# Patient Record
Sex: FEMALE | Race: WHITE | Employment: OTHER | URBAN - METROPOLITAN AREA
[De-identification: names, ages, dates, MRNs, and addresses within clinical notes are randomized per-mention and may not be internally consistent; named-entity substitution may affect disease eponyms.]

---

## 2019-03-08 ENCOUNTER — HOSPITAL ENCOUNTER (EMERGENCY)
Age: 84
Discharge: HOME | DRG: 603 | End: 2019-03-08
Attending: EMERGENCY MEDICINE | Admitting: EMERGENCY MEDICINE
Payer: MEDICARE

## 2019-03-08 VITALS
HEART RATE: 109 BPM | TEMPERATURE: 98.4 F | OXYGEN SATURATION: 98 % | BODY MASS INDEX: 26.98 KG/M2 | HEIGHT: 64 IN | SYSTOLIC BLOOD PRESSURE: 128 MMHG | DIASTOLIC BLOOD PRESSURE: 62 MMHG | WEIGHT: 158 LBS | RESPIRATION RATE: 23 BRPM

## 2019-03-08 DIAGNOSIS — L03.116 CELLULITIS OF LEFT ANKLE: Primary | ICD-10-CM

## 2019-03-08 DIAGNOSIS — S91.012A LACERATION OF LEFT ANKLE, INITIAL ENCOUNTER: ICD-10-CM

## 2019-03-08 LAB
ANION GAP SERPL CALC-SCNC: 7 MMOL/L (ref 3–18)
BASOPHILS # BLD: 0 K/UL (ref 0–0.1)
BASOPHILS NFR BLD: 0 % (ref 0–2)
BUN SERPL-MCNC: 26 MG/DL (ref 7–18)
BUN/CREAT SERPL: 31 (ref 12–20)
CALCIUM SERPL-MCNC: 9.8 MG/DL (ref 8.5–10.1)
CHLORIDE SERPL-SCNC: 106 MMOL/L (ref 100–108)
CO2 SERPL-SCNC: 27 MMOL/L (ref 21–32)
CREAT SERPL-MCNC: 0.85 MG/DL (ref 0.6–1.3)
DIFFERENTIAL METHOD BLD: ABNORMAL
EOSINOPHIL # BLD: 0.1 K/UL (ref 0–0.4)
EOSINOPHIL NFR BLD: 1 % (ref 0–5)
ERYTHROCYTE [DISTWIDTH] IN BLOOD BY AUTOMATED COUNT: 14 % (ref 11.6–14.5)
GLUCOSE SERPL-MCNC: 203 MG/DL (ref 74–99)
HCT VFR BLD AUTO: 43.1 % (ref 35–45)
HGB BLD-MCNC: 13.8 G/DL (ref 12–16)
LACTATE BLD-SCNC: 1.42 MMOL/L (ref 0.4–2)
LYMPHOCYTES # BLD: 0.6 K/UL (ref 0.9–3.6)
LYMPHOCYTES NFR BLD: 5 % (ref 21–52)
MCH RBC QN AUTO: 28.2 PG (ref 24–34)
MCHC RBC AUTO-ENTMCNC: 32 G/DL (ref 31–37)
MCV RBC AUTO: 88.1 FL (ref 74–97)
MONOCYTES # BLD: 0.8 K/UL (ref 0.05–1.2)
MONOCYTES NFR BLD: 7 % (ref 3–10)
NEUTS SEG # BLD: 9.9 K/UL (ref 1.8–8)
NEUTS SEG NFR BLD: 87 % (ref 40–73)
PLATELET # BLD AUTO: 178 K/UL (ref 135–420)
PMV BLD AUTO: 9.8 FL (ref 9.2–11.8)
POTASSIUM SERPL-SCNC: 3.8 MMOL/L (ref 3.5–5.5)
RBC # BLD AUTO: 4.89 M/UL (ref 4.2–5.3)
SODIUM SERPL-SCNC: 140 MMOL/L (ref 136–145)
WBC # BLD AUTO: 11.4 K/UL (ref 4.6–13.2)

## 2019-03-08 PROCEDURE — 74011250636 HC RX REV CODE- 250/636: Performed by: EMERGENCY MEDICINE

## 2019-03-08 PROCEDURE — 99284 EMERGENCY DEPT VISIT MOD MDM: CPT

## 2019-03-08 PROCEDURE — 74011000250 HC RX REV CODE- 250: Performed by: EMERGENCY MEDICINE

## 2019-03-08 PROCEDURE — 96375 TX/PRO/DX INJ NEW DRUG ADDON: CPT

## 2019-03-08 PROCEDURE — 96365 THER/PROPH/DIAG IV INF INIT: CPT

## 2019-03-08 PROCEDURE — 96366 THER/PROPH/DIAG IV INF ADDON: CPT

## 2019-03-08 PROCEDURE — 83605 ASSAY OF LACTIC ACID: CPT

## 2019-03-08 PROCEDURE — 87040 BLOOD CULTURE FOR BACTERIA: CPT

## 2019-03-08 PROCEDURE — 85025 COMPLETE CBC W/AUTO DIFF WBC: CPT

## 2019-03-08 PROCEDURE — 80048 BASIC METABOLIC PNL TOTAL CA: CPT

## 2019-03-08 RX ORDER — CEPHALEXIN 500 MG/1
500 CAPSULE ORAL 4 TIMES DAILY
Qty: 28 CAP | Refills: 0 | Status: SHIPPED | OUTPATIENT
Start: 2019-03-08 | End: 2019-03-12

## 2019-03-08 RX ORDER — TRAMADOL HYDROCHLORIDE 50 MG/1
50 TABLET ORAL
Qty: 30 TAB | Refills: 0 | Status: SHIPPED | OUTPATIENT
Start: 2019-03-08 | End: 2019-03-13

## 2019-03-08 RX ORDER — DOXYCYCLINE HYCLATE 100 MG
100 TABLET ORAL 2 TIMES DAILY
Qty: 20 TAB | Refills: 0 | Status: SHIPPED | OUTPATIENT
Start: 2019-03-08 | End: 2019-03-18

## 2019-03-08 RX ADMIN — CEFTRIAXONE 1 G: 1 INJECTION, POWDER, FOR SOLUTION INTRAMUSCULAR; INTRAVENOUS at 14:06

## 2019-03-08 RX ADMIN — VANCOMYCIN HYDROCHLORIDE 1000 MG: 10 INJECTION, POWDER, LYOPHILIZED, FOR SOLUTION INTRAVENOUS at 14:12

## 2019-03-08 NOTE — ED TRIAGE NOTES
Patient with complaints of a cut to the bottom on the left foot. Patient with complaints of left ankle and foot pain, redess and warmth.

## 2019-03-08 NOTE — PROGRESS NOTES
Vancomycin - Pharmacy to Dose  Consult provided for this 81y.o. year old ,female for indication of Skin and Soft Tissue Infection. Therapy day 1        Wt Readings from Last 1 Encounters:   03/08/19 71.7 kg (158 lb)       Ht Readings from Last 1 Encounters:   03/08/19 162.6 cm (64\")     Dosing Weight:  71.7 kg     Additional Antibiotics:   Rocephin    Date:  3/8/19   Lab Results   Component Value Date/Time    Creatinine 0.85 03/08/2019 01:20 PM     creatinine clearance:  48.7 ml/min    white blood cell count:  11.4    Patient received Vancomycin 1000 mg IV at 14:12. Will continue Vancomycin 1000 mg IV q24hrs. Trough Level after 3-4 doses infused. Dose calculated to approximate a therapeutic trough of 15-20 mcg/mL. Pharmacy to follow daily and will make changes to dose and/or frequency based on clinical status.       [id number] [facility], [address] [place] [address]

## 2019-03-08 NOTE — ED PROVIDER NOTES
EMERGENCY DEPARTMENT HISTORY AND PHYSICAL EXAM    Date: 3/8/2019  Patient Name: Shanita Cervantes    History of Presenting Illness     Chief Complaint   Patient presents with    Foot Pain         History Provided By: Patient and Patient's Daughter    Chief Complaint: Foot pain  Duration: last night  Timing:  Acute  Location: Left foot  Severity: 8 out of 10  Modifying Factors: Pain is exacerbated with movement. Associated Symptoms:  left foot laceration with warmth and redness, left ankle/foot swelling and chills    Additional History (Context):   1:06 PM  Shanita Cervantes is a 81 y.o. female with PMHX of diabetes, CAD, RBBB, colon cancer and HTN who presents to the emergency department C/O left foot/ankle pain (8/10) onset last night. Associated sxs include left foot laceration with warmth and redness, left ankle/foot swelling and chills. Pain is exacerbated with movement. Tetanus is not up to date within 5 years. Pt denies fever, abdominal pain, and any other sxs or complaints. PCP: Other, MD Candie    Current Facility-Administered Medications   Medication Dose Route Frequency Provider Last Rate Last Dose    cefTRIAXone (ROCEPHIN) 1 g in sterile water (preservative free) 10 mL IV syringe  1 g IntraVENous Q24H Alirio Romero MD   1 g at 03/08/19 1406    Vancomycin - Pharmacokinetic Dosing  1 Each Other Rx Dosing/Monitoring Antonio Byrne MD        [START ON 3/9/2019] vancomycin (VANCOCIN) 1,000 mg in 0.9% sodium chloride 250 mL IVPB  1,000 mg IntraVENous Q24H Alirio Romero MD         Current Outpatient Medications   Medication Sig Dispense Refill    cephALEXin (KEFLEX) 500 mg capsule Take 1 Cap by mouth four (4) times daily for 7 days. 28 Cap 0    doxycycline (VIBRA-TABS) 100 mg tablet Take 1 Tab by mouth two (2) times a day for 10 days. 20 Tab 0    traMADol (ULTRAM) 50 mg tablet Take 1 Tab by mouth every four (4) hours as needed for Pain for up to 5 days.  Max Daily Amount: 300 mg. 30 Tab 0    aspirin (ASPIRIN) 325 mg tablet Take 325 mg by mouth daily.  BRIMONIDINE TARTRATE/TIMOLOL (COMBIGAN OP) Apply  to eye.  rosuvastatin (CRESTOR) 10 mg tablet Take 10 mg by mouth nightly.  DORZOLAMIDE HCL (DORZOLAMIDE OP) Apply  to eye.  LEVOTHYROXINE SODIUM (LEVOTHYROXINE PO) Take  by mouth.  bimatoprost (LUMIGAN) 0.03 % ophthalmic drops Administer 1 Drop to both eyes every evening.  metFORMIN (GLUCOPHAGE) 500 mg tablet Take  by mouth two (2) times daily (with meals).  metoprolol ta-hydrochlorothiaz (LOPRESSOR HCT) 50-25 mg per tablet Take 1 Tab by mouth daily.  vitamin a-vitamin c-vit e-min (OCUVITE) tablet Take 1 Tab by mouth daily.  Cholecalciferol, Vitamin D3, (VITAMIN D3) 1,000 unit cap Take  by mouth.  potassium chloride (KLOR-CON M20) 20 mEq tablet Take 20 mEq by mouth daily.          Past History     Past Medical History:  Past Medical History:   Diagnosis Date    Abnormal EKG 9/30/2013    Abnormal electrocardiogram 9/30/2013    Cancer ([facility]     colon    Coronary artery disease 9/30/2013    Diabetes ([facility]     Diabetes mellitus type II, non insulin dependent ([facility] 9/30/2013    Essential hypertension     Essential hypertension, benign 9/30/2013    First degree AV block 9/30/2013    H/O angioplasty 1994 and 2002    Low back pain 9/30/2013    Other and unspecified hyperlipidemia 9/30/2013    Right bundle branch block 9/30/2013    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    Thyroid disease        Past Surgical History:  Past Surgical History:   Procedure Laterality Date    HX HERNIA REPAIR  Dec 2006    HX HYSTERECTOMY  Aug 1983       Family History:  Family History   Problem Relation Age of Onset    Heart Attack Mother     Heart Attack Father        Social History:  Social History     Tobacco Use    Smoking status: Never Smoker    Smokeless tobacco: Never Used   Substance Use Topics    Alcohol use: No    Drug use: Not on file Allergies: Allergies   Allergen Reactions    Codeine Nausea and Vomiting         Review of Systems   Review of Systems   Constitutional: Positive for chills. Negative for fever. Gastrointestinal: Negative for abdominal pain. Musculoskeletal: Positive for arthralgias (left foot/ankle) and joint swelling (left foot/ankle). Skin: Positive for color change (left foot redness) and wound (left foot laceration). (+) Left foot warmth   All other systems reviewed and are negative. Physical Exam     Vitals:    03/08/19 1445 03/08/19 1500 03/08/19 1515 03/08/19 1530   BP: 126/70 144/59 152/62 147/60   Pulse: (!) 101 (!) 102 (!) 103 (!) 105   Resp: 21 15 20 14   Temp:       SpO2: 96% 97% 99% 98%   Weight:       Height:         Physical Exam   Nursing note and vitals reviewed. Vital signs and nursing notes reviewed    CONSTITUTIONAL: Alert, in no apparent distress; well-developed; well-nourished. HEAD:  Normocephalic, atraumatic  EYES: PERRL; EOM's intact. ENTM: Nose: no rhinorrhea; Throat: no erythema or exudate, mucous membranes moist  Neck:  No JVD, supple without lymphadenopathy  RESP: Chest clear, equal breath sounds. CV: S1 and S2 WNL; No murmurs, gallops or rubs. GI: Normal bowel sounds, abdomen soft and non-tender. No masses or organomegaly. : No costo-vertebral angle tenderness. BACK:  Non-tender  UPPER EXT:  Normal inspection  LOWER EXT: transverse 1 cm laceration to left achilles heel, 5 cm of surrounding erythema with warmth and tenderness, no calf tenderness. Distal pulses intact. NEURO: CN intact, reflexes 2/4 and sym, strength 5/5 and sym, sensation intact. SKIN: No rashes; Normal for age and stage. PSYCH:  Alert and oriented, normal affect.     Diagnostic Study Results     Labs -     Recent Results (from the past 12 hour(s))   CBC WITH AUTOMATED DIFF    Collection Time: 03/08/19  1:20 PM   Result Value Ref Range    WBC 11.4 4.6 - 13.2 K/uL    RBC 4.89 4.20 - 5.30 M/uL    HGB 13.8 12.0 - 16.0 g/dL    HCT 43.1 35.0 - 45.0 %    MCV 88.1 74.0 - 97.0 FL    MCH 28.2 24.0 - 34.0 PG    MCHC 32.0 31.0 - 37.0 g/dL    RDW 14.0 11.6 - 14.5 %    PLATELET 322 851 - 754 K/uL    MPV 9.8 9.2 - 11.8 FL    NEUTROPHILS 87 (H) 40 - 73 %    LYMPHOCYTES 5 (L) 21 - 52 %    MONOCYTES 7 3 - 10 %    EOSINOPHILS 1 0 - 5 %    BASOPHILS 0 0 - 2 %    ABS. NEUTROPHILS 9.9 (H) 1.8 - 8.0 K/UL    ABS. LYMPHOCYTES 0.6 (L) 0.9 - 3.6 K/UL    ABS. MONOCYTES 0.8 0.05 - 1.2 K/UL    ABS. EOSINOPHILS 0.1 0.0 - 0.4 K/UL    ABS. BASOPHILS 0.0 0.0 - 0.1 K/UL    DF AUTOMATED     METABOLIC PANEL, BASIC    Collection Time: 03/08/19  1:20 PM   Result Value Ref Range    Sodium 140 136 - 145 mmol/L    Potassium 3.8 3.5 - 5.5 mmol/L    Chloride 106 100 - 108 mmol/L    CO2 27 21 - 32 mmol/L    Anion gap 7 3.0 - 18 mmol/L    Glucose 203 (H) 74 - 99 mg/dL    BUN 26 (H) 7.0 - 18 MG/DL    Creatinine 0.85 0.6 - 1.3 MG/DL    BUN/Creatinine ratio 31 (H) 12 - 20      GFR est AA >60 >60 ml/min/1.73m2    GFR est non-AA >60 >60 ml/min/1.73m2    Calcium 9.8 8.5 - 10.1 MG/DL   POC LACTIC ACID    Collection Time: 03/08/19  1:27 PM   Result Value Ref Range    Lactic Acid (POC) 1.42 0.40 - 2.00 mmol/L       Radiologic Studies -   No orders to display     CT Results  (Last 48 hours)    None        CXR Results  (Last 48 hours)    None          Medications given in the ED-  Medications   cefTRIAXone (ROCEPHIN) 1 g in sterile water (preservative free) 10 mL IV syringe (1 g IntraVENous Given 3/8/19 2316)   Vancomycin - Pharmacokinetic Dosing (not administered)   vancomycin (VANCOCIN) 1,000 mg in 0.9% sodium chloride 250 mL IVPB (not administered)   vancomycin (VANCOCIN) 1,000 mg in 0.9% sodium chloride 250 mL IVPB (1,000 mg IntraVENous New Bag 3/8/19 1412)         Medical Decision Making   I am the first provider for this patient.     I reviewed the vital signs, available nursing notes, past medical history, past surgical history, family history and social history. Vital Signs-Reviewed the patient's vital signs. Pulse Oximetry Analysis - 100% on RA     Records Reviewed: Nursing Notes and Old Medical Records    Provider Notes (Medical Decision Making): cellulitis, laceration    Procedures:  Procedures    ED Course:   1:06 PM Initial assessment performed. The patients presenting problems have been discussed, and they are in agreement with the care plan formulated and outlined with them. I have encouraged them to ask questions as they arise throughout their visit. Diagnosis and Disposition     Discharge:  81 y.o female with laceration to left heel for > 24 hours. Pt is beyond the timeframe when sutures would be effective for repair, but laceration is small and well approximated on its own. Pt does have a surrounding cellulitis, but normal WBC with a left shift. Pt is suitable for discharge home on outpatient antibiotics. Will also give a small prescription for pain medication. Pt and daughter (RN) agree with the plan. She will follow up with her PCM. DISCHARGE NOTE:  4:32 PM  Savanah Dawn's  results have been reviewed with her. She has been counseled regarding her diagnosis, treatment, and plan. She verbally conveys understanding and agreement of the signs, symptoms, diagnosis, treatment and prognosis and additionally agrees to follow up as discussed. She also agrees with the care-plan and conveys that all of her questions have been answered. I have also provided discharge instructions for her that include: educational information regarding their diagnosis and treatment, and list of reasons why they would want to return to the ED prior to their follow-up appointment, should her condition change. She has been provided with education for proper emergency department utilization. CLINICAL IMPRESSION:    1. Cellulitis of left ankle    2. Laceration of left ankle, initial encounter        PLAN:  1. D/C Home  2.    Current Discharge Medication List START taking these medications    Details   cephALEXin (KEFLEX) 500 mg capsule Take 1 Cap by mouth four (4) times daily for 7 days. Qty: 28 Cap, Refills: 0      doxycycline (VIBRA-TABS) 100 mg tablet Take 1 Tab by mouth two (2) times a day for 10 days. Qty: 20 Tab, Refills: 0      traMADol (ULTRAM) 50 mg tablet Take 1 Tab by mouth every four (4) hours as needed for Pain for up to 5 days. Max Daily Amount: 300 mg. Qty: 30 Tab, Refills: 0    Associated Diagnoses: Cellulitis of left ankle         CONTINUE these medications which have NOT CHANGED    Details   aspirin (ASPIRIN) 325 mg tablet Take 325 mg by mouth daily. BRIMONIDINE TARTRATE/TIMOLOL (COMBIGAN OP) Apply  to eye.      rosuvastatin (CRESTOR) 10 mg tablet Take 10 mg by mouth nightly. DORZOLAMIDE HCL (DORZOLAMIDE OP) Apply  to eye. LEVOTHYROXINE SODIUM (LEVOTHYROXINE PO) Take  by mouth.      bimatoprost (LUMIGAN) 0.03 % ophthalmic drops Administer 1 Drop to both eyes every evening. metFORMIN (GLUCOPHAGE) 500 mg tablet Take  by mouth two (2) times daily (with meals). metoprolol ta-hydrochlorothiaz (LOPRESSOR HCT) 50-25 mg per tablet Take 1 Tab by mouth daily. vitamin a-vitamin c-vit e-min (OCUVITE) tablet Take 1 Tab by mouth daily. Cholecalciferol, Vitamin D3, (VITAMIN D3) 1,000 unit cap Take  by mouth.      potassium chloride (KLOR-CON M20) 20 mEq tablet Take 20 mEq by mouth daily. 3.   Follow-up Information     Follow up With Specialties Details Why Contact Cindy Aguirre DO Internal Medicine Schedule an appointment as soon as possible for a visit in 3 days For primary care follow up. [address]      THE [facility] EMERGENCY DEPT Emergency Medicine Go to As needed, If symptoms worsen [address] [address] [phone]        _______________________________    Attestations:   This note is prepared by Jermaine Taylor, acting as Scribe for Ovidio Galdamez MD.    Ovidio Galdamez MD:  The scribe's documentation has been prepared under my direction and personally reviewed by me in its entirety.   I confirm that the note above accurately reflects all work, treatment, procedures, and medical decision making performed by me.  _______________________________

## 2019-03-08 NOTE — DISCHARGE INSTRUCTIONS

## 2019-03-10 ENCOUNTER — HOSPITAL ENCOUNTER (INPATIENT)
Age: 84
LOS: 2 days | Discharge: HOME | DRG: 603 | End: 2019-03-12
Attending: EMERGENCY MEDICINE | Admitting: INTERNAL MEDICINE
Payer: MEDICARE

## 2019-03-10 DIAGNOSIS — L03.116 CELLULITIS OF LEFT FOOT: Primary | ICD-10-CM

## 2019-03-10 PROBLEM — L03.90 CELLULITIS: Status: ACTIVE | Noted: 2019-03-10

## 2019-03-10 LAB
ALBUMIN SERPL-MCNC: 3.7 G/DL (ref 3.4–5)
ALBUMIN/GLOB SERPL: 1.2 {RATIO} (ref 0.8–1.7)
ALP SERPL-CCNC: 113 U/L (ref 45–117)
ALT SERPL-CCNC: 18 U/L (ref 13–56)
ANION GAP SERPL CALC-SCNC: 6 MMOL/L (ref 3–18)
ANION GAP SERPL CALC-SCNC: 7 MMOL/L (ref 3–18)
AST SERPL-CCNC: 19 U/L (ref 15–37)
BASOPHILS # BLD: 0 K/UL (ref 0–0.1)
BASOPHILS NFR BLD: 0 % (ref 0–2)
BILIRUB SERPL-MCNC: 1.3 MG/DL (ref 0.2–1)
BUN SERPL-MCNC: 17 MG/DL (ref 7–18)
BUN SERPL-MCNC: 22 MG/DL (ref 7–18)
BUN/CREAT SERPL: 25 (ref 12–20)
BUN/CREAT SERPL: 26 (ref 12–20)
CALCIUM SERPL-MCNC: 8.8 MG/DL (ref 8.5–10.1)
CALCIUM SERPL-MCNC: 9.2 MG/DL (ref 8.5–10.1)
CHLORIDE SERPL-SCNC: 104 MMOL/L (ref 100–108)
CHLORIDE SERPL-SCNC: 105 MMOL/L (ref 100–108)
CO2 SERPL-SCNC: 26 MMOL/L (ref 21–32)
CO2 SERPL-SCNC: 27 MMOL/L (ref 21–32)
CREAT SERPL-MCNC: 0.68 MG/DL (ref 0.6–1.3)
CREAT SERPL-MCNC: 0.85 MG/DL (ref 0.6–1.3)
DIFFERENTIAL METHOD BLD: ABNORMAL
EOSINOPHIL # BLD: 0.1 K/UL (ref 0–0.4)
EOSINOPHIL NFR BLD: 1 % (ref 0–5)
ERYTHROCYTE [DISTWIDTH] IN BLOOD BY AUTOMATED COUNT: 14.3 % (ref 11.6–14.5)
EST. AVERAGE GLUCOSE BLD GHB EST-MCNC: 183 MG/DL
GLOBULIN SER CALC-MCNC: 3.2 G/DL (ref 2–4)
GLUCOSE SERPL-MCNC: 179 MG/DL (ref 74–99)
GLUCOSE SERPL-MCNC: 203 MG/DL (ref 74–99)
HBA1C MFR BLD: 8 % (ref 4.2–5.6)
HCT VFR BLD AUTO: 38.5 % (ref 35–45)
HGB BLD-MCNC: 12.4 G/DL (ref 12–16)
LYMPHOCYTES # BLD: 0.7 K/UL (ref 0.9–3.6)
LYMPHOCYTES NFR BLD: 7 % (ref 21–52)
MCH RBC QN AUTO: 28.5 PG (ref 24–34)
MCHC RBC AUTO-ENTMCNC: 32.2 G/DL (ref 31–37)
MCV RBC AUTO: 88.5 FL (ref 74–97)
MONOCYTES # BLD: 0.7 K/UL (ref 0.05–1.2)
MONOCYTES NFR BLD: 7 % (ref 3–10)
NEUTS SEG # BLD: 8.8 K/UL (ref 1.8–8)
NEUTS SEG NFR BLD: 85 % (ref 40–73)
PLATELET # BLD AUTO: 159 K/UL (ref 135–420)
PMV BLD AUTO: 9.7 FL (ref 9.2–11.8)
POTASSIUM SERPL-SCNC: 3.7 MMOL/L (ref 3.5–5.5)
POTASSIUM SERPL-SCNC: 3.7 MMOL/L (ref 3.5–5.5)
PROT SERPL-MCNC: 6.9 G/DL (ref 6.4–8.2)
RBC # BLD AUTO: 4.35 M/UL (ref 4.2–5.3)
SODIUM SERPL-SCNC: 137 MMOL/L (ref 136–145)
SODIUM SERPL-SCNC: 138 MMOL/L (ref 136–145)
WBC # BLD AUTO: 10.3 K/UL (ref 4.6–13.2)

## 2019-03-10 PROCEDURE — 36415 COLL VENOUS BLD VENIPUNCTURE: CPT

## 2019-03-10 PROCEDURE — 85025 COMPLETE CBC W/AUTO DIFF WBC: CPT

## 2019-03-10 PROCEDURE — 83036 HEMOGLOBIN GLYCOSYLATED A1C: CPT

## 2019-03-10 PROCEDURE — 87040 BLOOD CULTURE FOR BACTERIA: CPT

## 2019-03-10 PROCEDURE — 80053 COMPREHEN METABOLIC PANEL: CPT

## 2019-03-10 PROCEDURE — 74011250637 HC RX REV CODE- 250/637: Performed by: INTERNAL MEDICINE

## 2019-03-10 PROCEDURE — 99282 EMERGENCY DEPT VISIT SF MDM: CPT

## 2019-03-10 PROCEDURE — 74011000250 HC RX REV CODE- 250: Performed by: EMERGENCY MEDICINE

## 2019-03-10 PROCEDURE — 74011250636 HC RX REV CODE- 250/636: Performed by: EMERGENCY MEDICINE

## 2019-03-10 PROCEDURE — 65270000029 HC RM PRIVATE

## 2019-03-10 RX ORDER — DEXTROSE 50 % IN WATER (D50W) INTRAVENOUS SYRINGE
25-50 AS NEEDED
Status: DISCONTINUED | OUTPATIENT
Start: 2019-03-10 | End: 2019-03-12 | Stop reason: HOSPADM

## 2019-03-10 RX ORDER — POTASSIUM CHLORIDE 20 MEQ/1
20 TABLET, EXTENDED RELEASE ORAL DAILY
Status: DISCONTINUED | OUTPATIENT
Start: 2019-03-11 | End: 2019-03-12 | Stop reason: HOSPADM

## 2019-03-10 RX ORDER — ATORVASTATIN CALCIUM 20 MG/1
40 TABLET, FILM COATED ORAL DAILY
Status: DISCONTINUED | OUTPATIENT
Start: 2019-03-11 | End: 2019-03-12 | Stop reason: HOSPADM

## 2019-03-10 RX ORDER — METOPROLOL TARTRATE 50 MG/1
50 TABLET ORAL 2 TIMES DAILY
Status: DISCONTINUED | OUTPATIENT
Start: 2019-03-10 | End: 2019-03-12 | Stop reason: HOSPADM

## 2019-03-10 RX ORDER — INSULIN LISPRO 100 [IU]/ML
INJECTION, SOLUTION INTRAVENOUS; SUBCUTANEOUS
Status: DISCONTINUED | OUTPATIENT
Start: 2019-03-10 | End: 2019-03-11 | Stop reason: SDUPTHER

## 2019-03-10 RX ORDER — LATANOPROST 50 UG/ML
1 SOLUTION/ DROPS OPHTHALMIC DAILY
Status: DISCONTINUED | OUTPATIENT
Start: 2019-03-11 | End: 2019-03-12 | Stop reason: HOSPADM

## 2019-03-10 RX ORDER — GUAIFENESIN 100 MG/5ML
81 LIQUID (ML) ORAL DAILY
Status: DISCONTINUED | OUTPATIENT
Start: 2019-03-11 | End: 2019-03-12 | Stop reason: HOSPADM

## 2019-03-10 RX ORDER — MAGNESIUM SULFATE 100 %
4 CRYSTALS MISCELLANEOUS AS NEEDED
Status: DISCONTINUED | OUTPATIENT
Start: 2019-03-10 | End: 2019-03-12 | Stop reason: HOSPADM

## 2019-03-10 RX ORDER — LATANOPROST 50 UG/ML
1 SOLUTION/ DROPS OPHTHALMIC DAILY
COMMUNITY

## 2019-03-10 RX ORDER — ATORVASTATIN CALCIUM 40 MG/1
40 TABLET, FILM COATED ORAL DAILY
COMMUNITY

## 2019-03-10 RX ORDER — METOPROLOL TARTRATE 100 MG/1
50 TABLET ORAL 2 TIMES DAILY
COMMUNITY

## 2019-03-10 RX ORDER — AMLODIPINE BESYLATE 5 MG/1
5 TABLET ORAL DAILY
COMMUNITY

## 2019-03-10 RX ORDER — AMLODIPINE BESYLATE 5 MG/1
5 TABLET ORAL DAILY
Status: DISCONTINUED | OUTPATIENT
Start: 2019-03-11 | End: 2019-03-12 | Stop reason: HOSPADM

## 2019-03-10 RX ORDER — TRAMADOL HYDROCHLORIDE 50 MG/1
50 TABLET ORAL
Status: DISCONTINUED | OUTPATIENT
Start: 2019-03-10 | End: 2019-03-12 | Stop reason: HOSPADM

## 2019-03-10 RX ADMIN — VANCOMYCIN HYDROCHLORIDE 1000 MG: 1 INJECTION, POWDER, LYOPHILIZED, FOR SOLUTION INTRAVENOUS at 11:22

## 2019-03-10 RX ADMIN — CEFTRIAXONE 1 G: 1 INJECTION, POWDER, FOR SOLUTION INTRAMUSCULAR; INTRAVENOUS at 11:13

## 2019-03-10 RX ADMIN — METOPROLOL TARTRATE 50 MG: 50 TABLET ORAL at 21:00

## 2019-03-10 NOTE — H&P
History & Physical    Patient: Sherice Naylor MRN: [id number]  CSN: [id number]    YOB: 1935  Age: 81 y.o. Sex: female      DOA: 3/10/2019  Primary Care Provider:  Candie Choi MD      Assessment/Plan     Patient Active Problem List   Diagnosis Code    Abnormal EKG R94.31    Coronary artery disease I25.10    Abnormal electrocardiogram R94.31    Right bundle branch block I45.10    First degree AV block I44.0    Low back pain M54.5    Other and unspecified hyperlipidemia E78.5    Diabetes mellitus type II, non insulin dependent (HCC) E11.9    Essential hypertension, benign I10    Cellulitis L03.90       Active hospital problems:  1. Left lower extremity cellulitis  2. Type two diabetes  3. Hypothyroidism  4. Hypertension  5. Coronary artery disease    -Admit patient to the hospital continuing antibiotics started in the ER to include ceftriaxone and vancomycin. Blood cultures collected times four, initially on 8 March 2019 which showed no growth at two days and on her current presentation. Will monitor and adjust antibiotics as indicated. -Hold Metformin for now.  Check fingersticks QAC and HS in conjunction with insulin sliding scale.  -Continue patients home dose of Synthroid 125 µg daily.  -Continue current therapy with amlodipine 5 mg daily as well as metoprolol 50 mg twice daily.  -Continue beta blocker, Lupillo drug and aspirin  -DVT prophylaxis: SCDs  -CODE STATUS: full code      Estimated length of stay : 2-3 days    CC: LLE infection       HPI:     Sherice Naylor is a 81 y.o. female with a past medical history significant for coronary artery disease, type two diabetes, hypertension and hypothyroidism who was seen two days prior in the ER complaining of left lower extremity redness, tenderness and swelling consistent with infection, was treated with IV antibiotics in the ER and then subsequently sent out on outpatient treatment only to return today complaining of initial improvement yesterday but now with further spread of the redness and swelling outside the boundaries previously marked. She reports some chills but is unaware of any fever. She denies other associated symptoms including but not limited to nausea, vomiting, shortness of breath, chest pain. On arrival she was afebrile, pulse 89, blood pressure 125/63, respirations 16 with oxygen saturation of 97% on room air. Laboratory evaluation was on remarkable with the exception of a blood glucose of 203 and a slightly elevated total bilirubin. In the ER she was given Rocephin, vancomycin and subsequently hospital medicine was contacted for admission to the hospital and further management. Past Medical History:   Diagnosis Date    Abnormal EKG 9/30/2013    Abnormal electrocardiogram 9/30/2013    Cancer (HCC)     colon    Coronary artery disease 9/30/2013    Diabetes ([facility] [address]     Diabetes mellitus type II, non insulin dependent ([facility] 9/30/2013    Essential hypertension     Essential hypertension, benign 9/30/2013    First degree AV block 9/30/2013    H/O angioplasty 1994 and 2002    Low back pain 9/30/2013    Other and unspecified hyperlipidemia 9/30/2013    Right bundle branch block 9/30/2013    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    Thyroid disease        Past Surgical History:   Procedure Laterality Date    HX HERNIA REPAIR  Dec 2006    HX HYSTERECTOMY  Aug 1983       Family History   Problem Relation Age of Onset    Heart Attack Mother     Heart Attack Father        Social History     Socioeconomic History    Marital status:      Spouse name: Not on file    Number of children: Not on file    Years of education: Not on file    Highest education level: Not on file   Tobacco Use    Smoking status: Never Smoker    Smokeless tobacco: Never Used   Substance and Sexual Activity    Alcohol use: No    Drug use: No       Prior to Admission medications    Medication Sig Start Date End Date Taking? Authorizing Provider   amLODIPine (NORVASC) 5 mg tablet Take 5 mg by mouth daily. Yes Other, MD Candie   atorvastatin (LIPITOR) 40 mg tablet Take 40 mg by mouth daily. Yes Other, MD Candie   latanoprost (XALATAN) 0.005 % ophthalmic solution Administer 1 Drop to both eyes daily. Yes Other, MD Candie   metoprolol tartrate (LOPRESSOR) 100 mg IR tablet Take 50 mg by mouth two (2) times a day. Yes Other, MD Candie   vit A/vit C/vit E/zinc/copper (ICAPS AREDS PO) Take  by mouth. Yes Other, MD Candie   aspirin (ASPIRIN) 325 mg tablet Take 81 mg by mouth. Yes Provider, Historical   LEVOTHYROXINE SODIUM (LEVOTHYROXINE PO) Take 125 mcg by mouth daily. Yes Provider, Historical   metFORMIN (GLUCOPHAGE) 500 mg tablet Take 500 mg by mouth two (2) times daily (with meals). Yes Provider, Historical   Cholecalciferol, Vitamin D3, (VITAMIN D3) 1,000 unit cap Take 1,000 Units by mouth daily. Yes Provider, Historical   cephALEXin (KEFLEX) 500 mg capsule Take 1 Cap by mouth four (4) times daily for 7 days. 3/8/19 3/15/19  Girma Romero MD   doxycycline (VIBRA-TABS) 100 mg tablet Take 1 Tab by mouth two (2) times a day for 10 days. 3/8/19 3/18/19  Girma Romero MD   traMADol (ULTRAM) 50 mg tablet Take 1 Tab by mouth every four (4) hours as needed for Pain for up to 5 days. Max Daily Amount: 300 mg. 3/8/19 3/13/19  Girma Romero MD   BRIMONIDINE TARTRATE/TIMOLOL (COMBIGAN OP) Apply  to eye. Provider, Historical   DORZOLAMIDE HCL (DORZOLAMIDE OP) Apply  to eye. Provider, Historical   bimatoprost (LUMIGAN) 0.03 % ophthalmic drops Administer 1 Drop to both eyes every evening. Provider, Historical   vitamin a-vitamin c-vit e-min (OCUVITE) tablet Take 1 Tab by mouth daily. Provider, Historical   potassium chloride (KLOR-CON M20) 20 mEq tablet Take 20 mEq by mouth daily.     Provider, Historical       Allergies   Allergen Reactions    Codeine Nausea and Vomiting       Review of Systems  Gen: No fever, +chills, no malaise, weight loss/gain. Heent: No headache, rhinorrhea, epistaxis, ear pain, hearing loss, sinus pain, neck pain/stiffness, sore throat. Heart: No chest pain, palpitations, MARSH, pnd, or orthopnea. Resp: No cough, hemoptysis, wheezing and shortness of breath. GI: No nausea, vomiting, diarrhea, constipation, melena or hematochezia. : No urinary obstruction, dysuria or hematuria. Derm: +LLE swelling and erythema. Musc/skeletal: no bone or joint complains. Vasc: 1+ edema no cyanosis or claudication. Endo: No heat/cold intolerance, no polyuria,polydipsia or polyphagia. Neuro: No unilateral weakness, numbness, tingling. No seizures. Heme: No easy bruising or bleeding. Physical Exam:     Physical Exam:  Visit Vitals  /47 (BP 1 Location: Left arm, BP Patient Position: At rest)   Pulse 90   Temp 98.9 °F (37.2 °C)   Resp 16   Ht 5' 4\" (1.626 m)   Wt 70.3 kg (155 lb)   SpO2 97%   BMI 26.61 kg/m²      O2 Device: Room air    Temp (24hrs), Av.6 °F (37 °C), Min:98.3 °F (36.8 °C), Max:98.9 °F (37.2 °C)    No intake/output data recorded. No intake/output data recorded. General:  Awake, cooperative, no distress. Head:  Normocephalic, without obvious abnormality, atraumatic. Eyes:  Conjunctivae/corneas clear, sclera anicteric, PERRL, EOMs intact. Nose: Nares normal. No drainage or sinus tenderness. Throat: Lips, mucosa, and tongue normal.    Neck: Supple, symmetrical, trachea midline, no adenopathy. Lungs:   Clear to auscultation bilaterally. Heart:  Regular rate and rhythm, S1, S2 normal, no murmur, click, rub or gallop. Abdomen: Soft, non-tender. Bowel sounds normal. No masses,  No organomegaly. Extremities: Extremities normal, atraumatic, no cyanosis or edema. Capillary refill normal.   Pulses: 2+ and symmetric all extremities. Skin: Tender erythematous LLE   Neurologic: CNII-XII intact. No focal motor or sensory deficit.        Labs Reviewed: All lab results for the last 24 hours reviewed. Recent Results (from the past 24 hour(s))   CBC WITH AUTOMATED DIFF    Collection Time: 03/10/19 10:40 AM   Result Value Ref Range    WBC 10.3 4.6 - 13.2 K/uL    RBC 4.35 4.20 - 5.30 M/uL    HGB 12.4 12.0 - 16.0 g/dL    HCT 38.5 35.0 - 45.0 %    MCV 88.5 74.0 - 97.0 FL    MCH 28.5 24.0 - 34.0 PG    MCHC 32.2 31.0 - 37.0 g/dL    RDW 14.3 11.6 - 14.5 %    PLATELET 266 634 - 585 K/uL    MPV 9.7 9.2 - 11.8 FL    NEUTROPHILS 85 (H) 40 - 73 %    LYMPHOCYTES 7 (L) 21 - 52 %    MONOCYTES 7 3 - 10 %    EOSINOPHILS 1 0 - 5 %    BASOPHILS 0 0 - 2 %    ABS. NEUTROPHILS 8.8 (H) 1.8 - 8.0 K/UL    ABS. LYMPHOCYTES 0.7 (L) 0.9 - 3.6 K/UL    ABS. MONOCYTES 0.7 0.05 - 1.2 K/UL    ABS. EOSINOPHILS 0.1 0.0 - 0.4 K/UL    ABS. BASOPHILS 0.0 0.0 - 0.1 K/UL    DF AUTOMATED     METABOLIC PANEL, COMPREHENSIVE    Collection Time: 03/10/19 10:40 AM   Result Value Ref Range    Sodium 138 136 - 145 mmol/L    Potassium 3.7 3.5 - 5.5 mmol/L    Chloride 104 100 - 108 mmol/L    CO2 27 21 - 32 mmol/L    Anion gap 7 3.0 - 18 mmol/L    Glucose 203 (H) 74 - 99 mg/dL    BUN 22 (H) 7.0 - 18 MG/DL    Creatinine 0.85 0.6 - 1.3 MG/DL    BUN/Creatinine ratio 26 (H) 12 - 20      GFR est AA >60 >60 ml/min/1.73m2    GFR est non-AA >60 >60 ml/min/1.73m2    Calcium 9.2 8.5 - 10.1 MG/DL    Bilirubin, total 1.3 (H) 0.2 - 1.0 MG/DL    ALT (SGPT) 18 13 - 56 U/L    AST (SGOT) 19 15 - 37 U/L    Alk.  phosphatase 113 45 - 117 U/L    Protein, total 6.9 6.4 - 8.2 g/dL    Albumin 3.7 3.4 - 5.0 g/dL    Globulin 3.2 2.0 - 4.0 g/dL    A-G Ratio 1.2 0.8 - 1.7         Procedures/imaging: see electronic medical records for all procedures/Xrays and details which were not copied into this note but were reviewed prior to creation of Plan          CC: Other, MD Candie

## 2019-03-10 NOTE — ED TRIAGE NOTES
Pt seen here Friday afternoon dx with cellulitis, initially pt reports seemed to get better, this am had increase pain and redness has increased lt ankle

## 2019-03-10 NOTE — PROGRESS NOTES
1245 Patient received from ED in satisfactory condition. Denies pain. Swelling and redness noted to foot. Daughter at bedside. Skin intact. No needs at this time. Will monitor for changes. 1300 Dr. Gali Matias paged for inpatient orders. 1600 Bedside and Verbal shift change report given to Cinthya Paris RN (oncoming nurse) by Laverne Bush RN (offgoing nurse). Report included the following information SBAR, Kardex, MAR, Recent Results and Med Rec Status.

## 2019-03-10 NOTE — ROUTINE PROCESS
TRANSFER - OUT REPORT:    Verbal report given to [address] [place] [address] Street, RN(name) on Alcira Guerra  being transferred to (unit) for routine progression of care       Report consisted of patients Situation, Background, Assessment and   Recommendations(SBAR). Information from the following report(s) SBAR, ED Summary, [facility] and Recent Results was reviewed with the receiving nurse. Lines:   Peripheral IV 03/10/19 Right; Outer; Upper Forearm (Active)   Site Assessment Clean, dry, & intact 3/10/2019 10:47 AM   Phlebitis Assessment 0 3/10/2019 10:47 AM   Infiltration Assessment 0 3/10/2019 10:47 AM   Dressing Status Clean, dry, & intact 3/10/2019 10:47 AM   Dressing Type Transparent 3/10/2019 10:47 AM   Hub Color/Line Status Blue;Patent; Flushed 3/10/2019 10:47 AM   Action Taken Blood drawn 3/10/2019 10:47 AM   Alcohol Cap Used Yes 3/10/2019 10:47 AM        Opportunity for questions and clarification was provided.       Patient transported with:  [employer]

## 2019-03-10 NOTE — ED PROVIDER NOTES
EMERGENCY DEPARTMENT HISTORY AND PHYSICAL EXAM    Date: 3/10/2019  Patient Name: Candelaria Loja    History of Presenting Illness     Chief Complaint   Patient presents with    Skin Infection         History Provided By: Patient    Chief Complaint: left ankle pain, swelling, and erythema  Duration: 3 Days  Timing:  Gradual and Worsening  Location: left ankle  Quality: pain, swelling  Modifying Factors: Keflex, Doxycycline without relief  Associated Symptoms: denies any other associated signs or symptoms    Additional History (Context):   10:21 AM   Candelaria Loja is a 81 y.o. female with PMHX of diabetes, CAD, RBBB, colon cancer and HTN who presents to the emergency department C/O left ankle pain, swelling, and erythema starting 3 ago after sustaining a laceration to the heel, and the pain and swelling has been gradually worsening. The patient was seen here 2 days ago and evaluated by blood work which was negative. She was diagnosed with cellulitis, and given prescriptions for Keflex, Doxycycline, and Tramadol. Her symptoms initially improved yesterday, but worsened today. Pt denies fever, and any other sxs or complaints. PCP: Jimbo, MD Candie    Current Facility-Administered Medications   Medication Dose Route Frequency Provider Last Rate Last Dose    cefTRIAXone (ROCEPHIN) 1 g in sterile water (preservative free) 10 mL IV syringe  1 g IntraVENous Q24H David Romero MD        vancomycin (VANCOCIN) 1,000 mg in 0.9% sodium chloride 250 mL IVPB  1,000 mg IntraVENous ONCE David Romero MD         Current Outpatient Medications   Medication Sig Dispense Refill    amLODIPine (NORVASC) 5 mg tablet Take 5 mg by mouth daily.  atorvastatin (LIPITOR) 40 mg tablet Take 40 mg by mouth daily.  latanoprost (XALATAN) 0.005 % ophthalmic solution Administer 1 Drop to both eyes daily.  metoprolol tartrate (LOPRESSOR) 100 mg IR tablet Take 50 mg by mouth two (2) times a day.       vit A/vit C/vit E/zinc/copper (ICAPS AREDS PO) Take  by mouth.  aspirin (ASPIRIN) 325 mg tablet Take 81 mg by mouth.  LEVOTHYROXINE SODIUM (LEVOTHYROXINE PO) Take 125 mcg by mouth daily.  metFORMIN (GLUCOPHAGE) 500 mg tablet Take 500 mg by mouth two (2) times daily (with meals).  cephALEXin (KEFLEX) 500 mg capsule Take 1 Cap by mouth four (4) times daily for 7 days. 28 Cap 0    doxycycline (VIBRA-TABS) 100 mg tablet Take 1 Tab by mouth two (2) times a day for 10 days. 20 Tab 0    traMADol (ULTRAM) 50 mg tablet Take 1 Tab by mouth every four (4) hours as needed for Pain for up to 5 days. Max Daily Amount: 300 mg. 30 Tab 0    BRIMONIDINE TARTRATE/TIMOLOL (COMBIGAN OP) Apply  to eye.  DORZOLAMIDE HCL (DORZOLAMIDE OP) Apply  to eye.  bimatoprost (LUMIGAN) 0.03 % ophthalmic drops Administer 1 Drop to both eyes every evening.  vitamin a-vitamin c-vit e-min (OCUVITE) tablet Take 1 Tab by mouth daily.  Cholecalciferol, Vitamin D3, (VITAMIN D3) 1,000 unit cap Take 1,000 Units by mouth daily.  potassium chloride (KLOR-CON M20) 20 mEq tablet Take 20 mEq by mouth daily.          Past History     Past Medical History:  Past Medical History:   Diagnosis Date    Abnormal EKG 9/30/2013    Abnormal electrocardiogram 9/30/2013    Cancer (HCC)     colon    Coronary artery disease 9/30/2013    Diabetes [facility]     Diabetes mellitus type II, non insulin dependent ([facility] [address] [facility] 9/30/2013    Essential hypertension     Essential hypertension, benign 9/30/2013    First degree AV block 9/30/2013    H/O angioplasty 1994 and 2002    Low back pain 9/30/2013    Other and unspecified hyperlipidemia 9/30/2013    Right bundle branch block 9/30/2013    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    Thyroid disease        Past Surgical History:  Past Surgical History:   Procedure Laterality Date    HX HERNIA REPAIR  Dec 2006    HX HYSTERECTOMY  Aug 1983       Family History:  Family History   Problem Relation Age of Onset  Heart Attack Mother     Heart Attack Father        Social History:  Social History     Tobacco Use    Smoking status: Never Smoker    Smokeless tobacco: Never Used   Substance Use Topics    Alcohol use: No    Drug use: No       Allergies: Allergies   Allergen Reactions    Codeine Nausea and Vomiting         Review of Systems   Review of Systems   Constitutional: Negative for fever. Musculoskeletal: Positive for arthralgias (left ankle pain). Skin: Positive for color change (erythema of left ankle) and wound (healing laceration to left heel). All other systems reviewed and are negative. Physical Exam     Vitals:    03/10/19 1000   BP: 125/63   Pulse: 89   Resp: 16   Temp: 98.7 °F (37.1 °C)   SpO2: 97%   Weight: 70.3 kg (155 lb)   Height: 5' 4\" (1.626 m)     Physical Exam   Nursing note and vitals reviewed. CONSTITUTIONAL: Alert, in no apparent distress; well-developed; well-nourished. HEAD:  Normocephalic, atraumatic  EYES: PERRL; EOM's intact. ENTM: Nose: no rhinorrhea; Throat: no erythema or exudate, mucous membranes moist  Neck:  No JVD, supple without lymphadenopathy  RESP: Chest clear, equal breath sounds. CV: S1 and S2 WNL; No murmurs, gallops or rubs. GI: Normal bowel sounds, abdomen soft and non-tender. No masses or organomegaly. : No costo-vertebral angle tenderness. BACK:  Non-tender  UPPER EXT:  Normal inspection  LOWER EXT: There is a 1 cm transverse laceration over the Achilles heel. She has extension of her erythema, warmth, and swelling to the left foot which is now past the border drawn 2 days ago, involving the entire left foot and 4 cm up the left leg. NEURO: CN intact, reflexes 2/4 and sym, strength 5/5 and sym, sensation intact. SKIN: Please see Lower Ext. PSYCH:  Alert and oriented, normal affect. Diagnostic Study Results     Labs -   No results found for this or any previous visit (from the past 12 hour(s)).     Radiologic Studies -   No orders to display Medications given in the ED-  Medications   cefTRIAXone (ROCEPHIN) 1 g in sterile water (preservative free) 10 mL IV syringe (not administered)   vancomycin (VANCOCIN) 1,000 mg in 0.9% sodium chloride 250 mL IVPB (not administered)         Medical Decision Making   I am the first provider for this patient. I reviewed the vital signs, available nursing notes, past medical history, past surgical history, family history and social history. Vital Signs-Reviewed the patient's vital signs. Pulse Oximetry Analysis - 97% on room air     Records Reviewed: Nursing Notes, Old Medical Records and Previous Laboratory Studies     Reviewed blood cultures from 3/8/2019; no growth. Provider Notes (Medical Decision Making): Ddx: cellulitis, sepsis, abscess    Procedures:  Procedures    ED Course:   10:21 AM Initial assessment performed. The patients presenting problems have been discussed, and they are in agreement with the care plan formulated and outlined with them. I have encouraged them to ask questions as they arise throughout their visit. 10:34 AM Discussed patient's history, exam, and treatment course from her last visit, with Otilia Woods MD, internal medicine, who agrees to accept the patient to the medical floor. Diagnosis and Disposition     Discussion: The patient is a 81year old female who presents to the ED for obvious cellulitis. The patient was seen by me 2 days ago for her cellulitis and has been compliant with antibiotics. She is on appropriate treatment. She had initial improvement yesterday, but worsened today. This is clearly failure of outpatient therapy. Discussed with hospitalist for admission. Patient and family agree with plan. Core Measures:  For Hospitalized Patients:    1. Hospitalization Decision Time:  The decision to hospitalize the patient was made by Tai Villatoro MD at 10:21 AM on 3/10/2019    2.  Aspirin: Aspirin was not given because the patient did not present with a stroke at the time of their Emergency Department evaluation    10:35 AM  Patient is being admitted to the hospital by Callie Merrill MD. The results of their tests and reasons for their admission have been discussed with them and/or available family. They convey agreement and understanding for the need to be admitted and for their admission diagnosis. CONDITIONS ON ADMISSION:  Sepsis is not present at the time of admission. Deep Vein Thrombosis is not present at the time of admission. Thrombosis is not present at the time of admission. Urinary Tract Infection is not present at the time of admission. Pneumonia is not present at the time of admission. Wound infection is present at the time of admission. Pressure Ulcer is not present at the time of admission. CLINICAL IMPRESSION:    1. Cellulitis of left foot       _______________________________    Attestations: This note is prepared by Emma Chen, acting as Scribe for Milton Bonilla MD.    Milton Bonilla MD:  The scribe's documentation has been prepared under my direction and personally reviewed by me in its entirety.   I confirm that the note above accurately reflects all work, treatment, procedures, and medical decision making performed by me.  _______________________________

## 2019-03-10 NOTE — PROGRESS NOTES
1620 Received report from Page Castillo RN at patients bedside. Patient laying in bed. Call bell within reach, gripper socks on, and pt in no apparent distress. 1625 Pt has redness and swelling to lower left food. 1800 Pt resting in bed. Pt denies needing anything. Room free of clutter. Pt has call light within reach. Will continue to monitor. 2020 Verbal shift change report given to Jefry Fletcher RN  by Kraig Alejandra RN. Report included the following information SBAR, Kardex, Intake/Output, MAR and Recent Results. Pt in no distress,call bell within reach, and gripper socks on.

## 2019-03-10 NOTE — PROGRESS NOTES
Pharmacy Dosing Services: vancomycin    Consult for Vancomycin Dosing by Pharmacy by Dr. Hector Barton provided for this 81y.o. year old female , for indication of SSTI. Ht Readings from Last 1 Encounters:   03/10/19 162.6 cm (64\")        Wt Readings from Last 1 Encounters:   03/10/19 70.3 kg (155 lb)        Previous Regimen Vancomycin 1g q24h   Other Current Antibiotics Rocephin 1 g q24h   Serum Creatinine Lab Results   Component Value Date/Time    Creatinine 0.85 03/08/2019 01:20 PM      Creatinine Clearance Estimated Creatinine Clearance: 48.2 mL/min (based on SCr of 0.85 mg/dL). BUN Lab Results   Component Value Date/Time    BUN 26 (H) 03/08/2019 01:20 PM      WBC Lab Results   Component Value Date/Time    WBC 10.3 03/10/2019 10:40 AM      H/H Lab Results   Component Value Date/Time    HGB 12.4 03/10/2019 10:40 AM      Platelets Lab Results   Component Value Date/Time    PLATELET 637 72/85/1742 10:40 AM      Temp 98.7 °F (37.1 °C)     Was previously on vancomycin Friday 3/8/19 with a dose of 1000mg q24h  Restarting this dose with maintenance dose of 1000 mg  at 1100 3/10/19, every 24 hours. Dose calculated to approximate a therapeutic trough of 10-20 mcg/mL. Pharmacy to follow daily and will make changes to dose and/or frequency based on clinical status.       [address] [facility], [address]

## 2019-03-11 ENCOUNTER — APPOINTMENT (OUTPATIENT)
Dept: GENERAL RADIOLOGY | Age: 84
DRG: 603 | End: 2019-03-11
Attending: HOSPITALIST
Payer: MEDICARE

## 2019-03-11 LAB
ANION GAP SERPL CALC-SCNC: 7 MMOL/L (ref 3–18)
BUN SERPL-MCNC: 17 MG/DL (ref 7–18)
BUN/CREAT SERPL: 25 (ref 12–20)
CALCIUM SERPL-MCNC: 9.1 MG/DL (ref 8.5–10.1)
CHLORIDE SERPL-SCNC: 106 MMOL/L (ref 100–108)
CO2 SERPL-SCNC: 27 MMOL/L (ref 21–32)
CREAT SERPL-MCNC: 0.68 MG/DL (ref 0.6–1.3)
ERYTHROCYTE [DISTWIDTH] IN BLOOD BY AUTOMATED COUNT: 14.2 % (ref 11.6–14.5)
GLUCOSE BLD STRIP.AUTO-MCNC: 117 MG/DL (ref 70–110)
GLUCOSE BLD STRIP.AUTO-MCNC: 212 MG/DL (ref 70–110)
GLUCOSE BLD STRIP.AUTO-MCNC: 218 MG/DL (ref 70–110)
GLUCOSE BLD STRIP.AUTO-MCNC: 225 MG/DL (ref 70–110)
GLUCOSE BLD STRIP.AUTO-MCNC: 231 MG/DL (ref 70–110)
GLUCOSE SERPL-MCNC: 142 MG/DL (ref 74–99)
HCT VFR BLD AUTO: 40 % (ref 35–45)
HGB BLD-MCNC: 12.7 G/DL (ref 12–16)
MCH RBC QN AUTO: 28.2 PG (ref 24–34)
MCHC RBC AUTO-ENTMCNC: 31.8 G/DL (ref 31–37)
MCV RBC AUTO: 88.9 FL (ref 74–97)
PLATELET # BLD AUTO: 171 K/UL (ref 135–420)
PMV BLD AUTO: 9.6 FL (ref 9.2–11.8)
POTASSIUM SERPL-SCNC: 3.8 MMOL/L (ref 3.5–5.5)
RBC # BLD AUTO: 4.5 M/UL (ref 4.2–5.3)
SODIUM SERPL-SCNC: 140 MMOL/L (ref 136–145)
WBC # BLD AUTO: 8.2 K/UL (ref 4.6–13.2)

## 2019-03-11 PROCEDURE — 77030011256 HC DRSG MEPILEX <16IN NO BORD MOLN -A

## 2019-03-11 PROCEDURE — 74011250636 HC RX REV CODE- 250/636: Performed by: EMERGENCY MEDICINE

## 2019-03-11 PROCEDURE — 85027 COMPLETE CBC AUTOMATED: CPT

## 2019-03-11 PROCEDURE — 36415 COLL VENOUS BLD VENIPUNCTURE: CPT

## 2019-03-11 PROCEDURE — 74011000250 HC RX REV CODE- 250: Performed by: INTERNAL MEDICINE

## 2019-03-11 PROCEDURE — 80048 BASIC METABOLIC PNL TOTAL CA: CPT

## 2019-03-11 PROCEDURE — 73620 X-RAY EXAM OF FOOT: CPT

## 2019-03-11 PROCEDURE — 82962 GLUCOSE BLOOD TEST: CPT

## 2019-03-11 PROCEDURE — 74011250637 HC RX REV CODE- 250/637: Performed by: HOSPITALIST

## 2019-03-11 PROCEDURE — 74011000250 HC RX REV CODE- 250: Performed by: EMERGENCY MEDICINE

## 2019-03-11 PROCEDURE — 74011250637 HC RX REV CODE- 250/637: Performed by: INTERNAL MEDICINE

## 2019-03-11 PROCEDURE — 65270000029 HC RM PRIVATE

## 2019-03-11 PROCEDURE — 97161 PT EVAL LOW COMPLEX 20 MIN: CPT

## 2019-03-11 PROCEDURE — 74011636637 HC RX REV CODE- 636/637: Performed by: INTERNAL MEDICINE

## 2019-03-11 RX ORDER — INSULIN LISPRO 100 [IU]/ML
INJECTION, SOLUTION INTRAVENOUS; SUBCUTANEOUS
Status: DISCONTINUED | OUTPATIENT
Start: 2019-03-11 | End: 2019-03-12 | Stop reason: HOSPADM

## 2019-03-11 RX ORDER — CHOLECALCIFEROL (VITAMIN D3) 125 MCG
5 CAPSULE ORAL
Status: DISCONTINUED | OUTPATIENT
Start: 2019-03-11 | End: 2019-03-12 | Stop reason: HOSPADM

## 2019-03-11 RX ADMIN — AMLODIPINE BESYLATE 5 MG: 5 TABLET ORAL at 09:27

## 2019-03-11 RX ADMIN — LATANOPROST 1 DROP: 50 SOLUTION OPHTHALMIC at 10:40

## 2019-03-11 RX ADMIN — SODIUM CHLORIDE 1000 MG: 900 INJECTION, SOLUTION INTRAVENOUS at 12:24

## 2019-03-11 RX ADMIN — METOPROLOL TARTRATE 50 MG: 50 TABLET ORAL at 21:58

## 2019-03-11 RX ADMIN — INSULIN LISPRO 4 UNITS: 100 INJECTION, SOLUTION INTRAVENOUS; SUBCUTANEOUS at 17:18

## 2019-03-11 RX ADMIN — ATORVASTATIN CALCIUM 40 MG: 20 TABLET, FILM COATED ORAL at 09:27

## 2019-03-11 RX ADMIN — ASPIRIN 81 MG 81 MG: 81 TABLET ORAL at 09:27

## 2019-03-11 RX ADMIN — POTASSIUM CHLORIDE 20 MEQ: 20 TABLET, EXTENDED RELEASE ORAL at 09:26

## 2019-03-11 RX ADMIN — LEVOTHYROXINE SODIUM 125 MCG: 100 TABLET ORAL at 05:12

## 2019-03-11 RX ADMIN — TRAMADOL HYDROCHLORIDE 50 MG: 50 TABLET, FILM COATED ORAL at 22:01

## 2019-03-11 RX ADMIN — CEFTRIAXONE 1 G: 1 INJECTION, POWDER, FOR SOLUTION INTRAMUSCULAR; INTRAVENOUS at 10:55

## 2019-03-11 RX ADMIN — INSULIN LISPRO 4 UNITS: 100 INJECTION, SOLUTION INTRAVENOUS; SUBCUTANEOUS at 09:25

## 2019-03-11 RX ADMIN — METOPROLOL TARTRATE 50 MG: 50 TABLET ORAL at 09:27

## 2019-03-11 RX ADMIN — Medication 5 MG: at 22:01

## 2019-03-11 NOTE — ROUTINE PROCESS
Bedside and Verbal shift change report given to CONY Hare RN (oncoming nurse) by Lindsey Roberto (offgoing nurse). Report included the following information SBAR, Kardex, Intake/Output and MAR.

## 2019-03-11 NOTE — PROGRESS NOTES
Problem: Falls - Risk of  Goal: *Absence of Falls  Document Janet Fall Risk and appropriate interventions in the flowsheet.   Outcome: Progressing Towards Goal  Fall Risk Interventions:  Mobility Interventions: Assess mobility with egress test, Communicate number of staff needed for ambulation/transfer, Patient to call before getting OOB, PT Consult for mobility concerns, Utilize walker, cane, or other assistive device, PT Consult for assist device competence         Medication Interventions: Bed/chair exit alarm, Patient to call before getting OOB, Evaluate medications/consider consulting pharmacy, Teach patient to arise slowly    Elimination Interventions: Bed/chair exit alarm, Elevated toilet seat, Toilet paper/wipes in reach, Toileting schedule/hourly rounds

## 2019-03-11 NOTE — ROUTINE PROCESS
Bedside and Verbal shift change report given to Dyan Moise RN (oncoming nurse) by Erik Mora RN (offgoing nurse). Report included the following information SBAR, Kardex, Intake/Output and MAR.

## 2019-03-11 NOTE — PROGRESS NOTES
Hospitalist Progress Note    Patient: Rosita Castellon MRN: [id number]  CSN: [id number]    YOB: 1935  Age: 81 y.o. Sex: female    DOA: 3/10/2019 LOS:  LOS: 1 day                Assessment/Plan     Patient Active Problem List   Diagnosis Code    Abnormal EKG R94.31    Coronary artery disease I25.10    Abnormal electrocardiogram R94.31    Right bundle branch block I45.10    First degree AV block I44.0    Low back pain M54.5    Other and unspecified hyperlipidemia E78.5    Diabetes mellitus type II, non insulin dependent (HCC) E11.9    Essential hypertension, benign I10    Cellulitis L03.90            80 yo female admitted for left leg cellulitis. Complains of pain when she walks in her right foot. LLE cellulitis - continue with antibiotics, keep legs elevated. Antibiotics - vancomycin and ceftriaxone. Will get left foot x-ray    Open wound left achilles area. Local wound care. DM - on SSI    HTN - controlled on Norvasc, lopressor. DVT prophylaxis    Disposition : 1-2 days    Review of systems  General: No fevers or chills. Cardiovascular: No chest pain or pressure. No palpitations. Pulmonary: No shortness of breath. Gastrointestinal: No nausea, vomiting. Physical Exam:  General: Awake, cooperative, no acute distress    HEENT: NC, Atraumatic. PERRLA, anicteric sclerae. Lungs: CTA Bilaterally. No Wheezing/Rhonchi/Rales. Heart:  S1 S2,  No murmur, No Rubs, No Gallops  Abdomen: Soft, Non distended, Non tender.  +Bowel sounds,   Extremities: Right foot with redness of ankle, open wound left posterior leg. Psych:   Not anxious or agitated. Neurologic:  No acute neurological deficit. Vital signs/Intake and Output:  Visit Vitals  /68   Pulse 79   Temp 98.8 °F (37.1 °C)   Resp 18   Ht 5' 4\" (1.626 m)   Wt 70.6 kg (155 lb 9.6 oz)   SpO2 93%   BMI 26.71 kg/m²     Current Shift:  No intake/output data recorded.   Last three shifts:  03/09 1901 - 03/11 0700  In: 400 [P.O.:400]  Out: 200 [Urine:200]            Labs: Results:       Chemistry Recent Labs     03/11/19  0400 03/10/19  1626 03/10/19  1040   * 179* 203*    137 138   K 3.8 3.7 3.7    105 104   CO2 27 26 27   BUN 17 17 22*   CREA 0.68 0.68 0.85   CA 9.1 8.8 9.2   AGAP 7 6 7   BUCR 25* 25* 26*   AP  --   --  113   TP  --   --  6.9   ALB  --   --  3.7   GLOB  --   --  3.2   AGRAT  --   --  1.2      CBC w/Diff Recent Labs     03/11/19  0400 03/10/19  1040   WBC 8.2 10.3   RBC 4.50 4.35   HGB 12.7 12.4   HCT 40.0 38.5    159   GRANS  --  85*   LYMPH  --  7*   EOS  --  1      Cardiac Enzymes No results for input(s): CPK, CKND1, SE in the last 72 hours. No lab exists for component: CKRMB, TROIP   Coagulation No results for input(s): PTP, INR, APTT in the last 72 hours. No lab exists for component: INREXT    Lipid Panel No results found for: CHOL, CHOLPOCT, CHOLX, CHLST, CHOLV, 903917, HDL, LDL, LDLC, DLDLP, 639078, VLDLC, VLDL, TGLX, TRIGL, TRIGP, TGLPOCT, CHHD, CHHDX   BNP No results for input(s): BNPP in the last 72 hours.    Liver Enzymes Recent Labs     03/10/19  1040   TP 6.9   ALB 3.7      SGOT 19      Thyroid Studies No results found for: T4, T3U, TSH, TSHEXT     Procedures/imaging: see electronic medical records for all procedures/Xrays and details which were not copied into this note but were reviewed prior to creation of Plan

## 2019-03-11 NOTE — PROGRESS NOTES
Shift summary: No reports of pain. L leg red and painful to touch with +1 pitting edema. SCD to right leg only, per pt's request due to pain with SCD on L leg. Pt very unsteady when OOB with walker; instructed to call for assistance and to only use bedside commode pending approval of PT.      Patient Vitals for the past 8 hrs:   Temp Pulse Resp BP SpO2   03/10/19 2358 98.3 °F (36.8 °C) 90 16 126/58 98 %   03/10/19 2100  97  133/54    03/10/19 1919 98.3 °F (36.8 °C) 99 16 149/68 100 %

## 2019-03-11 NOTE — PROGRESS NOTES
Pharmacy Dosing Services: Vancomycin    Consult for Vancomycin Dosing by Pharmacy by Dr. Bety Fournier provided for this 81y.o. year old female , for indication of skin and soft tissue infection. Day of Therapy 3  (pt also received dose on 3/8/19)  Scr = 0.68   CrCl ~ 50 - 60 ml/min    WBC = 8.2     Dose adjusted to:  Vancomycin 1000 mg IV every 18 hours, next dose scheduled for 3/12/19 at 06:00. Dose calculated to approximate a therapeutic trough of 10 - 15 mcg/mL. Ht Readings from Last 1 Encounters:   03/10/19 162.6 cm (64\")        Wt Readings from Last 1 Encounters:   03/10/19 70.6 kg (155 lb 9.6 oz)        Previous Regimen Vancomycin 1000 mg IV q24h   Other Current Antibiotics Ceftriaxone 1 gram IV q24h   Significant Cultures pending   Serum Creatinine Lab Results   Component Value Date/Time    Creatinine 0.68 03/11/2019 04:00 AM      Creatinine Clearance Estimated Creatinine Clearance: 60.5 mL/min (based on SCr of 0.68 mg/dL). BUN Lab Results   Component Value Date/Time    BUN 17 03/11/2019 04:00 AM      WBC Lab Results   Component Value Date/Time    WBC 8.2 03/11/2019 04:00 AM      H/H Lab Results   Component Value Date/Time    HGB 12.7 03/11/2019 04:00 AM      Platelets Lab Results   Component Value Date/Time    PLATELET 061 45/90/9108 04:00 AM      Temp 98.8 °F (37.1 °C)     Pharmacy to follow daily and will make changes to dose and/or frequency based on clinical status.   Pharmacist Torey Peterson, [facility]

## 2019-03-11 NOTE — PROGRESS NOTES
Reason for Admission:   LLE cellulitis                  RRAT Score:    15              Do you (patient/family) have any concerns for transition/discharge?     none              Plan for utilizing home health:     tbd    Likelihood of readmission?   green            Transition of Care Plan:     Chart reviewed, met with pt in room. Pt admitted from home, lives with her , has DME for home, chair lift at home. Pt has personal caregivers for spouse 5 days/week to assist with his care, plans discharge home, at this time declines [facility] services. PT consulted, will await evaluation for further needs at discharge. Care Management Interventions  PCP Verified by CM:  Yes  Transition of Care Consult (CM Consult): Discharge Planning  Physical Therapy Consult: Yes  Current Support Network: Lives with Spouse, Own Home  Confirm Follow Up Transport: Family  Plan discussed with Pt/Family/Caregiver: Yes  Discharge Location  Discharge Placement: Home with family assistance

## 2019-03-11 NOTE — PROGRESS NOTES
Problem: Mobility Impaired (Adult and Pediatric)  Goal: *Acute Goals and Plan of Care (Insert Text)  Physical Therapy Goals   Initiated 3/11/2019 and to be accomplished within 5-7 day(s)  1. Patient will move from supine <> sit with S in prep for out of bed activity and change of position. 2.  Patient will perform sit<> stand with S with LRAD in prep for transfers/ambulation. 3.  Patient will transfer from bed <> chair with S with LRAD for time up in chair for completion of ADL activity. 4.  Patient will ambulate 150 feet with LRAD/S for improved functional mobility/safe discharge. Outcome: Progressing Towards Goal  physical Therapy EVALUATION    Patient: Amber Palumbo (63 y.o. female)  Date: 3/11/2019  Primary Diagnosis: Cellulitis [L03.90]       Precautions:Fall    ASSESSMENT :  Based on the objective data described below, the patient presents with decrease independence w/ bed mobility, transfers, gait, and step negotiation. Pt seen sitting at the EOB prior to session. Pt reported 5-6/10 pain in L LE prior to session. Pt reports PTA that she is her husbands caregiver however has a private caregiver that comes 5 days/wk to assist w/ her  and ADLs around the house. Pt reports that she uses a cane in the community and the rollator at home. Pt reports having several falls w/in the last year. Pt able to transfer from bed to chair w/ RW/GB and demonstrates an unsteady, antalgic gait, and decrease haroon. Pt left sitting in upright chair after session, B/L LE elevated on armless chair, call bell and tray in reach, nurse notified after session. Patient will benefit from skilled intervention to address the above impairments.   Patients rehabilitation potential is considered to be Good  Factors which may influence rehabilitation potential include:   []         None noted  []         Mental ability/status  [x]         Medical condition  [x]         Home/family situation and support systems  [x] Safety awareness  [x]         Pain tolerance/management  []         Other:      PLAN :  Recommendations and Planned Interventions:  [x]           Bed Mobility Training             [x]    Neuromuscular Re-Education  [x]           Transfer Training                   []    Orthotic/Prosthetic Training  [x]           Gait Training                          []    Modalities  [x]           Therapeutic Exercises          []    Edema Management/Control  [x]           Therapeutic Activities            [x]    Patient and Family Training/Education  []           Other (comment):    Frequency/Duration: Patient will be followed by physical therapy 1-2 times per day to address goals. Discharge Recommendations: Rehab  Further Equipment Recommendations for Discharge: N/A     SUBJECTIVE:   Patient stated I feel okay, I'm not suppose to be on my feet a lot right now.     OBJECTIVE DATA SUMMARY:     Past Medical History:   Diagnosis Date    Abnormal EKG 9/30/2013    Abnormal electrocardiogram 9/30/2013    Cancer (HCC)     colon    Coronary artery disease 9/30/2013    Diabetes ([facility]     Diabetes mellitus type II, non insulin dependent ([facility] [address] [facility] 9/30/2013    Essential hypertension     Essential hypertension, benign 9/30/2013    First degree AV block 9/30/2013    H/O angioplasty 1994 and 2002    Low back pain 9/30/2013    Other and unspecified hyperlipidemia 9/30/2013    Right bundle branch block 9/30/2013    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    Thyroid disease      Past Surgical History:   Procedure Laterality Date    HX HERNIA REPAIR  Dec 2006    HX HYSTERECTOMY  Aug 1983     Barriers to Learning/Limitations: yes;  physical  Compensate with: Verbal Cues and Tactile Cues  Prior Level of Function/Home Situation:   Home Situation  Home Environment: Assisted living  One/Two Story Residence: One story  Living Alone: No  Support Systems: Child(jenny), Spouse/Significant Other/Partner  Patient Expects to be Discharged to[de-identified] Assisted living  Current DME Used/Available at Home: Abraham Cramp, quad, Walker, rollator, Walker, rolling, Wheelchair  Critical Behavior:  Neurologic State: Alert  Orientation Level: Oriented X4  Psychosocial  Purposeful Interaction: Yes  Pt Identified Daily Priority: Clinical issues (comment)(IV abx, PT eval, monitor FSBS and s/sx infection)  Caritas Process: Nurture loving kindness;Establish trust  Caring Interventions: Reassure  Reassure: Therapeutic listening; Informing  Skin Condition/Temp: Dry;Warm  Skin Integrity: Intact  Skin Integumentary  Skin Color: Appropriate for ethnicity;Pink(erythema to LLE)  Skin Condition/Temp: Dry;Warm  Skin Integrity: Intact  Turgor: Non-tenting  Hair Growth: Present  Varicosities: Absent  Strength:    Strength: Generally decreased, functional  Tone & Sensation:   Tone: Normal  Sensation: Intact  Range Of Motion:  AROM: Generally decreased, functional  Functional Mobility:  Transfers:  Sit to Stand: Minimum assistance  Stand to Sit: Minimum assistance  Balance:   Sitting: Intact  Standing: Impaired; With support  Standing - Static: Fair  Standing - Dynamic : Fair(Fair-)  Ambulation/Gait Training:  Distance (ft): 3 Feet (ft)  Assistive Device: Gait belt;Walker, rolling  Ambulation - Level of Assistance: Minimal assistance  Gait Description (WDL): Exceptions to WDL  Gait Abnormalities: Antalgic;Decreased step clearance;Shuffling gait; Step to gait  Base of Support: Widened  Stance: Time  Speed/Bibi: Slow  Step Length: Left shortened;Right shortened  Swing Pattern: Left asymmetrical;Right asymmetrical  Pain:  Pain Scale 1: Numeric (0 - 10)  Pain Intensity 1: 0  Activity Tolerance:   Fair  Please refer to the flowsheet for vital signs taken during this treatment.   After treatment:   [x]         Patient left in no apparent distress sitting up in chair  []         Patient left in no apparent distress in bed  [x]         Call bell left within reach  [x]         Nursing notified  [] Caregiver present  []         Bed alarm activated    COMMUNICATION/EDUCATION:   [x]         Fall prevention education was provided and the patient/caregiver indicated understanding. [x]         Patient/family have participated as able in goal setting and plan of care. [x]         Patient/family agree to work toward stated goals and plan of care. []         Patient understands intent and goals of therapy, but is neutral about his/her participation. []         Patient is unable to participate in goal setting and plan of care.     Thank you for this referral.  Lauryn Vences, PT   Time Calculation: 20 mins

## 2019-03-11 NOTE — PROGRESS NOTES
Shift Summary: Uneventful shift. Patient slept through the night, denied pain and discomfort. LLE red, warm, and edematous with pulses palpable. Per patient, redness and swelling much improved.       Patient Vitals for the past 8 hrs:   Temp Pulse Resp BP SpO2   03/11/19 0500 98.5 °F (36.9 °C) 73 17 142/69 99 %   03/10/19 2358 98.3 °F (36.8 °C) 90 16 126/58 98 %

## 2019-03-11 NOTE — ROUTINE PROCESS
Bedside shift change report given to Citlaly Patel RN (oncoming nurse) by Ananth Gill RN (offgoing nurse). Report included the following information SBAR, Kardex, ED Summary, Intake/Output, MAR and Recent Results.

## 2019-03-11 NOTE — PROGRESS NOTES
Summary - Pt comfortable throughout shift. States sx significantly improved since admission. Began working with PT this afternoon. RN received VO for melatonin HS prn from Dr. Gary Reese during rounds. Patient Vitals for the past 12 hrs:   Temp Pulse Resp BP SpO2   03/11/19 1454 98.8 °F (37.1 °C) 79 18 125/68 93 %   03/11/19 1117 99.3 °F (37.4 °C) 68 18 120/46 95 %   03/11/19 0735 98.5 °F (36.9 °C) 82 18 131/55 99 %     Bedside shift change report given to Reji Harry RN (oncoming nurse) by Uri Yousif RN (offgoing nurse). Report included the following information SBAR, Kardex, ED Summary, Intake/Output, MAR and Recent Results.

## 2019-03-11 NOTE — DIABETES MGMT
Diabetes Patient/Family Education Record  Factors That  May Influence Patients Ability  to Learn or  Comply with Recommendations   []   Language barrier    []   Cultural needs   []   Motivation    []   Cognitive limitation    []   Physical   []   Education    []   Physiological factors   []   Hearing/vision/speaking impairment   []   Episcopalian beliefs    []   Financial factors   []  Other:   [x]  No factors identified at this time.      Person Instructed:   [x]   Patient   []   Family   []  Other     Preference for Learning:   [x]   Verbal   []   Written   []  Demonstration     Level of Comprehension & Competence:    [x]  Good                                      [] Fair                                     []  Poor                             [x]  Needs Reinforcement   [x]  Teachback completed    Education Component:   [x]  Medication management, including how to administer insulin (if appropriate) and potential medication interactions    [x]  Nutritional management -obtain usual meal pattern   []  Exercise   []  Signs, symptoms, and treatment of hyperglycemia and hypoglycemia   [] Prevention, recognition and treatment of hyperglycemia and hypoglycemia   []  Importance of blood glucose monitoring and how to obtain a blood glucose meter    []  Instruction on use of the blood glucose meter   [x]  Discuss the importance of HbA1C monitoring    []  Sick day guidelines   []  Proper use and disposal of lancets, needles, syringes or insulin pens (if appropriate)   [x]  Potential long-term complications (retinopathy, kidney disease, neuropathy, foot care) infection   [] Information about whom to contact in case of emergency or for more information    [x]  Goal:  Patient/family will demonstrate understanding of Diabetes Self Management Skills by: 3/21/19  Plan for post-discharge education or self-management support:    [x] Outpatient class schedule provided            [] Patient Declined    [] Scheduled for outpatient classes (date) _______  Verify:  Does patient understand how diabetes medications work? Yes  Does patient know what their most recent A1c is? Yes  Does patient monitor glucose at home? no  Does patient have difficulty obtaining diabetes medications or testing supplies?   No     Melita Vu  [facility] [phone]

## 2019-03-11 NOTE — DIABETES MGMT
NUTRITIONAL ASSESSMENT GLYCEMIC CONTROL/ PLAN OF CARE     Blanche Comes           81 y.o.           3/10/2019                 1. Cellulitis of left foot      PMHx: Type 2 Diabetes, Cancer colon, CAD, Essential HTN, First degree AV block, low back pain, hyperlipidemia, s/p triple vessel bypass, Thyroid disease       INTERVENTIONS/PLAN:   -Recommend continuing Diabetic consistent Carb diet  -Provide Diabetes Self-Mgt Education p/t d/c.    ASSESSMENT:   Nutritional Status:  Overweight per BMI 26.7 kg/m2 (25.0-29.9 kg/m2)      Diabetes Management:     Recent Glucose Results:   Lab Results   Component Value Date/Time     (H) 03/11/2019 04:00 AM     (H) 03/10/2019 04:26 PM    GLUCPOC 212 (H) 03/11/2019 04:17 PM    GLUCPOC 117 (H) 03/11/2019 11:49 AM    GLUCPOC 231 (H) 03/11/2019 08:32 AM     Within target range (non-ICU: <140; ICU<180): [] Yes   [x]  No    Current Insulin regimen:   Humalog corrective normal insulin sensitivity     Home medication/insulin regimen:   Metformin 500 mg BID    HbA1c: (3/10/19) 8.0% equivalent to average blood glucose level 183 mg/dL. Adequate glycemic control PTA:  [] Yes  [x] No       SUBJECTIVE/OBJECTIVE:   Information obtained from: Shared current Hgb A1C w/ pt . Pt confirms PTA diabetes medication. Pt states that in the last month she has worked on eating more moderate portion sizes. Pt reports normal appetite. Will continue to monitor and follow-up per policy.      Diet: DIET DIABETIC CONSISTENT CARB    Patient Vitals for the past 100 hrs:   % Diet Eaten   03/11/19 0625 0 %   03/10/19 2014 100 %       Medications: [x]                Reviewed   Synthroid  KCl  Vancomycin    Most Recent POC Glucose:   Recent Labs     03/11/19  0400 03/10/19  1626 03/10/19  1040   * 179* 203*         Labs:   Lab Results   Component Value Date/Time    Hemoglobin A1c 8.0 (H) 03/10/2019 04:26 PM     Lab Results   Component Value Date/Time    Sodium 140 03/11/2019 04:00 AM Potassium 3.8 03/11/2019 04:00 AM    Chloride 106 03/11/2019 04:00 AM    CO2 27 03/11/2019 04:00 AM    Anion gap 7 03/11/2019 04:00 AM    Glucose 142 (H) 03/11/2019 04:00 AM    BUN 17 03/11/2019 04:00 AM    Creatinine 0.68 03/11/2019 04:00 AM    Calcium 9.1 03/11/2019 04:00 AM    Albumin 3.7 03/10/2019 10:40 AM       Anthropometrics: BMI (calculated): 26.7 kg/m2  Wt Readings from Last 1 Encounters:   03/10/19 70.6 kg (155 lb 9.6 oz)      Ht Readings from Last 1 Encounters:   03/10/19 5' 4\" (1.626 m)       Estimated Nutrition Needs: 1463 kcal/day (25kcal/kg),69 g PRO/day ( 1.2 g PRO/kg),1463 mL fluid/day (1 mL fluid/kcal)  Based on: [x]            Adjusted BW: 58.52 kg    Nutrition Diagnoses: Altered nutrition-related lab value related to_as evidenced by Hgb A1C 8.0%. Nutrition Interventions:  -Recommend continuing Diabetic consistent Carb diet    Goal:   Blood glucose will be within target range of  mg/dL by 3/13/19.         Nutrition Monitoring and Evaluation      []     Monitor po intake on meal rounds  [x]     Continue inpatient monitoring and intervention  []     Other:    Nutrition Risk:  []   High     []  Moderate    [x]  Minimal/Uncompromised    Antonio Colbert  pgr [phone]

## 2019-03-12 VITALS
OXYGEN SATURATION: 97 % | RESPIRATION RATE: 18 BRPM | SYSTOLIC BLOOD PRESSURE: 138 MMHG | BODY MASS INDEX: 26.56 KG/M2 | TEMPERATURE: 98.5 F | WEIGHT: 155.6 LBS | DIASTOLIC BLOOD PRESSURE: 57 MMHG | HEART RATE: 90 BPM | HEIGHT: 64 IN

## 2019-03-12 LAB
ANION GAP SERPL CALC-SCNC: 7 MMOL/L (ref 3–18)
BUN SERPL-MCNC: 15 MG/DL (ref 7–18)
BUN/CREAT SERPL: 22 (ref 12–20)
CALCIUM SERPL-MCNC: 8.4 MG/DL (ref 8.5–10.1)
CHLORIDE SERPL-SCNC: 108 MMOL/L (ref 100–108)
CO2 SERPL-SCNC: 27 MMOL/L (ref 21–32)
CREAT SERPL-MCNC: 0.68 MG/DL (ref 0.6–1.3)
GLUCOSE BLD STRIP.AUTO-MCNC: 149 MG/DL (ref 70–110)
GLUCOSE SERPL-MCNC: 137 MG/DL (ref 74–99)
POTASSIUM SERPL-SCNC: 3.8 MMOL/L (ref 3.5–5.5)
SODIUM SERPL-SCNC: 142 MMOL/L (ref 136–145)

## 2019-03-12 PROCEDURE — 74011250636 HC RX REV CODE- 250/636: Performed by: EMERGENCY MEDICINE

## 2019-03-12 PROCEDURE — 82962 GLUCOSE BLOOD TEST: CPT

## 2019-03-12 PROCEDURE — 74011000250 HC RX REV CODE- 250: Performed by: EMERGENCY MEDICINE

## 2019-03-12 PROCEDURE — 36415 COLL VENOUS BLD VENIPUNCTURE: CPT

## 2019-03-12 PROCEDURE — 80048 BASIC METABOLIC PNL TOTAL CA: CPT

## 2019-03-12 PROCEDURE — 74011250637 HC RX REV CODE- 250/637: Performed by: INTERNAL MEDICINE

## 2019-03-12 RX ADMIN — METOPROLOL TARTRATE 50 MG: 50 TABLET ORAL at 09:53

## 2019-03-12 RX ADMIN — LEVOTHYROXINE SODIUM 125 MCG: 100 TABLET ORAL at 06:18

## 2019-03-12 RX ADMIN — POTASSIUM CHLORIDE 20 MEQ: 20 TABLET, EXTENDED RELEASE ORAL at 09:51

## 2019-03-12 RX ADMIN — LATANOPROST 1 DROP: 50 SOLUTION OPHTHALMIC at 09:51

## 2019-03-12 RX ADMIN — ATORVASTATIN CALCIUM 40 MG: 20 TABLET, FILM COATED ORAL at 09:53

## 2019-03-12 RX ADMIN — AMLODIPINE BESYLATE 5 MG: 5 TABLET ORAL at 09:53

## 2019-03-12 RX ADMIN — CEFTRIAXONE 1 G: 1 INJECTION, POWDER, FOR SOLUTION INTRAMUSCULAR; INTRAVENOUS at 09:51

## 2019-03-12 RX ADMIN — ASPIRIN 81 MG 81 MG: 81 TABLET ORAL at 09:53

## 2019-03-12 RX ADMIN — VANCOMYCIN HYDROCHLORIDE 1000 MG: 1 INJECTION, POWDER, LYOPHILIZED, FOR SOLUTION INTRAVENOUS at 05:58

## 2019-03-12 NOTE — DISCHARGE INSTRUCTIONS

## 2019-03-12 NOTE — PROGRESS NOTES
Summary - Pt comfortable throughout shift. Swelling and redness to LLE continued, but improving. No drainage noted. Pt discharging home today, declines HH and rehab recommendations. Patient Vitals for the past 12 hrs:   Temp Pulse Resp BP SpO2   03/12/19 0723 98.5 °F (36.9 °C) 90 18 138/57 97 %   03/12/19 0411 98.7 °F (37.1 °C) 82 17 124/59 100 %     I have reviewed discharge instructions and rx regimen with the patient. The patient verbalized understanding.

## 2019-03-12 NOTE — DISCHARGE SUMMARY
Discharge Summary    Patient: Yumiko Mcgovern MRN: [id number]  CSN: [id number]    YOB: 1935  Age: 81 y.o. Sex: female    DOA: 3/10/2019 LOS:  LOS: 2 days   Discharge Date: 3/12/2019     Primary Care Provider: Gela Isidro MD    Admission Diagnoses: Cellulitis [L03.90]    Discharge Diagnoses:    Problem List as of 3/12/2019 Date Reviewed: 9/30/2013          Codes Class Noted - Resolved    Cellulitis ICD-10-CM: L03.90  ICD-9-CM: 682.9  3/10/2019 - Present        Abnormal EKG ICD-10-CM: R94.31  ICD-9-CM: 794.31  9/30/2013 - Present        Coronary artery disease ICD-10-CM: I25.10  ICD-9-CM: 414.00  9/30/2013 - Present        Abnormal electrocardiogram ICD-10-CM: R94.31  ICD-9-CM: 794.31  9/30/2013 - Present        Right bundle branch block ICD-10-CM: I45.10  ICD-9-CM: 426.4  9/30/2013 - Present        First degree AV block ICD-10-CM: I44.0  ICD-9-CM: 426.11  9/30/2013 - Present        Low back pain ICD-10-CM: M54.5  ICD-9-CM: 724.2  9/30/2013 - Present        Other and unspecified hyperlipidemia ICD-10-CM: E78.5  ICD-9-CM: 272.4  9/30/2013 - Present        Diabetes mellitus type II, non insulin dependent [facility] ICD-10-CM: E11.9  ICD-9-CM: 250.00  9/30/2013 - Present        Essential hypertension, benign ICD-10-CM: I10  ICD-9-CM: 401.1  9/30/2013 - Present              Discharge Medications:     Discharge Medication List as of 3/12/2019 12:58 PM      CONTINUE these medications which have NOT CHANGED    Details   amLODIPine (NORVASC) 5 mg tablet Take 5 mg by mouth daily. , Historical Med      atorvastatin (LIPITOR) 40 mg tablet Take 40 mg by mouth daily. , Historical Med      latanoprost (XALATAN) 0.005 % ophthalmic solution Administer 1 Drop to both eyes daily. , Historical Med      metoprolol tartrate (LOPRESSOR) 100 mg IR tablet Take 50 mg by mouth two (2) times a day., Historical Med      aspirin (ASPIRIN) 325 mg tablet Take 81 mg by mouth., Historical Med      LEVOTHYROXINE SODIUM (LEVOTHYROXINE PO) Take 125 mcg by mouth daily. , Historical Med      metFORMIN (GLUCOPHAGE) 500 mg tablet Take 500 mg by mouth two (2) times daily (with meals). , Historical Med      Cholecalciferol, Vitamin D3, (VITAMIN D3) 1,000 unit cap Take 1,000 Units by mouth daily. , Historical Med      doxycycline (VIBRA-TABS) 100 mg tablet Take 1 Tab by mouth two (2) times a day for 10 days. , Normal, Disp-20 Tab, R-0      traMADol (ULTRAM) 50 mg tablet Take 1 Tab by mouth every four (4) hours as needed for Pain for up to 5 days. Max Daily Amount: 300 mg., Print, Disp-30 Tab, R-0      DORZOLAMIDE HCL (DORZOLAMIDE OP) Apply  to eye. Historical Med      potassium chloride (KLOR-CON M20) 20 mEq tablet Take 20 mEq by mouth daily. Historical Med, 20 mEq         STOP taking these medications       vit A/vit C/vit E/zinc/copper (ICAPS AREDS PO) Comments:   Reason for Stopping:         cephALEXin (KEFLEX) 500 mg capsule Comments:   Reason for Stopping:         BRIMONIDINE TARTRATE/TIMOLOL (COMBIGAN OP) Comments:   Reason for Stopping:         bimatoprost (LUMIGAN) 0.03 % ophthalmic drops Comments:   Reason for Stopping:         vitamin a-vitamin c-vit e-min (OCUVITE) tablet Comments:   Reason for Stopping:               Discharge Condition: Good        PHYSICAL EXAM   Visit Vitals  /57   Pulse 90   Temp 98.5 °F (36.9 °C)   Resp 18   Ht 5' 4\" (1.626 m)   Wt 70.6 kg (155 lb 9.6 oz)   SpO2 97%   BMI 26.71 kg/m²     General: Awake, cooperative, no acute distress    HEENT: NC, Atraumatic. PERRLA, EOMI. Anicteric sclerae. Lungs:  CTA Bilaterally. No Wheezing/Rhonchi/Rales. Heart:  Regular  rhythm,  No murmur, No Rubs, No Gallops  Abdomen: Soft, Non distended, Non tender. +Bowel sounds,   Extremities: Left foot swelling and redness improving. Psych:   Not anxious or agitated. Neurologic:  No acute neurological deficits.                                      Admission HPI : Chandler Woodall is a 81 y.o. female with a past medical history significant for coronary artery disease, type two diabetes, hypertension and hypothyroidism who was seen two days prior in the ER complaining of left lower extremity redness, tenderness and swelling consistent with infection, was treated with IV antibiotics in the ER and then subsequently sent out on outpatient treatment only to return today complaining of initial improvement yesterday but now with further spread of the redness and swelling outside the boundaries previously marked. She reports some chills but is unaware of any fever. She denies other associated symptoms including but not limited to nausea, vomiting, shortness of breath, chest pain. On arrival she was afebrile, pulse 89, blood pressure 125/63, respirations 16 with oxygen saturation of 97% on room air. Laboratory evaluation was on remarkable with the exception of a blood glucose of 203 and a slightly elevated total bilirubin. In the ER she was given Rocephin, vancomycin and subsequently hospital medicine was contacted for admission to the hospital and further management. Hospital Course :   Ms. Damaris Solis was admitted to medical floor. She was started on ceftriaxone and vancomycin. She was advised to keep her legs elevated. He blood cultures did not show any growth. She had improvement in her swelling and redness. X-Ray of left foot done did not show evidence of osteomyelitis. She had small open wound left achilles area and received local wound care. She received 3 days of IV antibiotics. Will discharge her on doxycycline. She worked with PT/Ot and home health recommended however patient declined. DM - started on SSI. HTN - controlled on home medications.      Activity: Activity as tolerated    Diet: Diabetic Diet    Follow-up: PCP    Disposition: home    Minutes spent on discharge: 45       Labs: Results:       Chemistry Recent Labs     03/12/19  0525 03/11/19  0400 03/10/19  1626 03/10/19  1040   * 142* 179* 203*   NA 142 140 137 138   K 3.8 3.8 3.7 3.7    106 105 104   CO2 27 27 26 27   BUN 15 17 17 22*   CREA 0.68 0.68 0.68 0.85   CA 8.4* 9.1 8.8 9.2   AGAP 7 7 6 7   BUCR 22* 25* 25* 26*   AP  --   --   --  113   TP  --   --   --  6.9   ALB  --   --   --  3.7   GLOB  --   --   --  3.2   AGRAT  --   --   --  1.2      CBC w/Diff Recent Labs     03/11/19  0400 03/10/19  1040   WBC 8.2 10.3   RBC 4.50 4.35   HGB 12.7 12.4   HCT 40.0 38.5    159   GRANS  --  85*   LYMPH  --  7*   EOS  --  1      Cardiac Enzymes No results for input(s): CPK, CKND1, SE in the last 72 hours. No lab exists for component: CKRMB, TROIP   Coagulation No results for input(s): PTP, INR, APTT in the last 72 hours. No lab exists for component: INREXT    Lipid Panel No results found for: CHOL, CHOLPOCT, CHOLX, CHLST, CHOLV, 250164, HDL, LDL, LDLC, DLDLP, 987809, VLDLC, VLDL, TGLX, TRIGL, TRIGP, TGLPOCT, CHHD, CHHDX   BNP No results for input(s): BNPP in the last 72 hours. Liver Enzymes Recent Labs     03/10/19  1040   TP 6.9   ALB 3.7      SGOT 19      Thyroid Studies No results found for: T4, T3U, TSH, TSHEXT         Significant Diagnostic Studies: Xr Foot Lt Ap/lat    Result Date: 3/12/2019  Left foot History: Left heel pain and swelling, possible osteomyelitis Comparison: None Findings: 2 views of the foot demonstrate no fracture or dislocation. Alignment is within normal limits. Diffuse soft tissue swelling is seen involving the foot. Prominent plantar calcaneal spur is present. Additional disease of flight formation is seen posteriorly along the calcaneus along the expected Achilles tendon insertion site. No definite soft tissue gas is seen. No periosteal reaction or erosive changes are seen. There are degenerative changes present involving the tibiotalar joint. IMPRESSION: Nonspecific diffuse soft tissue swelling, perhaps cellulitis or edema. No definite radiographic findings of acute osteomyelitis.  If there is high clinical suspicion, consider three-phase nuclear medicine bone scan or MRI evaluation. No results found for this or any previous visit.         CC: Zelda Santiago MD

## 2019-03-12 NOTE — ROUTINE PROCESS
19:40: Received verba/bedside change of shift report from CONY Lovelace RN. Report included SBAR, KARDEX, MAR and recent results. 20:10: Pt received resting quietly in bed speaking with family member on phone and in NAD. Respirations even and unlabored, skin W&D. Left foot presents with significant swelling, is warm to the touch and pedal pulse is discernable though thready. Pt denies C/O physical discomfort but expressed concern R/T being unable to sleep tonight. Off going RN addressed this concern and a PRN order for melatonin was obtained. Pt assured she would be provided with it if requested. Monitoring ongoing. 22:01: Pt requested and received PRN Melatonin 5 MG PO and Ultram 25 MG PO at this time. 06:15: Pt slept soundly overnight and appears to be in NAD. Pt reports one BM produced overnight. Bedside, Verbal and Written shift change report given to CONY Bright (oncoming nurse) by Cristel Foote (offgoing nurse). Report included the following information SBAR, Kardex, MAR and Recent Results.

## 2019-03-12 NOTE — ROUTINE PROCESS
Bedside shift change report given to Tammy Adams RN (oncoming nurse) by Dayton Vo RN (offgoing nurse). Report included the following information SBAR, Kardex, ED Summary, Intake/Output, MAR and Recent Results.

## 2019-03-12 NOTE — PROGRESS NOTES
Noted pt is medically stable for discharge today. CM and provider met with pt at bedside to discuss transition of care. CM discussed benefit of HH and rehab. Pt has declined both services. CM discussed steps to take if she finds she would benefit from [facility] after she is discharged. Pt is aware she is to contact her PCP to assist with [facility] services. Pt indicates she has someone coming in to assist her and her  and has daughters that live near by that will assist as needed. No other transition of care needs have been identified. Anticipate pt will be discharged today with physician follow up. Care Management Interventions  PCP Verified by CM:  Yes  Transition of Care Consult (CM Consult): Discharge Planning  Physical Therapy Consult: Yes  Current Support Network: Lives with Spouse, Own Home  Confirm Follow Up Transport: Family  Plan discussed with Pt/Family/Caregiver: Yes  Discharge Location  Discharge Placement: Home with family assistance

## 2019-03-14 LAB
BACTERIA SPEC CULT: NORMAL
BACTERIA SPEC CULT: NORMAL
SERVICE CMNT-IMP: NORMAL
SERVICE CMNT-IMP: NORMAL
